# Patient Record
Sex: FEMALE | Employment: FULL TIME | URBAN - METROPOLITAN AREA
[De-identification: names, ages, dates, MRNs, and addresses within clinical notes are randomized per-mention and may not be internally consistent; named-entity substitution may affect disease eponyms.]

---

## 2022-01-26 ENCOUNTER — OFFICE VISIT (OUTPATIENT)
Dept: URGENT CARE | Facility: CLINIC | Age: 57
End: 2022-01-26
Payer: COMMERCIAL

## 2022-01-26 VITALS
SYSTOLIC BLOOD PRESSURE: 120 MMHG | TEMPERATURE: 99 F | OXYGEN SATURATION: 99 % | BODY MASS INDEX: 23.39 KG/M2 | HEIGHT: 63 IN | DIASTOLIC BLOOD PRESSURE: 81 MMHG | HEART RATE: 75 BPM | WEIGHT: 132 LBS | RESPIRATION RATE: 18 BRPM

## 2022-01-26 DIAGNOSIS — R07.89 CHEST WALL PAIN: Primary | ICD-10-CM

## 2022-01-26 PROCEDURE — 99203 OFFICE O/P NEW LOW 30 MIN: CPT | Mod: S$GLB,,, | Performed by: PHYSICIAN ASSISTANT

## 2022-01-26 PROCEDURE — 3079F PR MOST RECENT DIASTOLIC BLOOD PRESSURE 80-89 MM HG: ICD-10-PCS | Mod: CPTII,S$GLB,, | Performed by: PHYSICIAN ASSISTANT

## 2022-01-26 PROCEDURE — 1159F MED LIST DOCD IN RCRD: CPT | Mod: CPTII,S$GLB,, | Performed by: PHYSICIAN ASSISTANT

## 2022-01-26 PROCEDURE — 3079F DIAST BP 80-89 MM HG: CPT | Mod: CPTII,S$GLB,, | Performed by: PHYSICIAN ASSISTANT

## 2022-01-26 PROCEDURE — 1160F RVW MEDS BY RX/DR IN RCRD: CPT | Mod: CPTII,S$GLB,, | Performed by: PHYSICIAN ASSISTANT

## 2022-01-26 PROCEDURE — 1159F PR MEDICATION LIST DOCUMENTED IN MEDICAL RECORD: ICD-10-PCS | Mod: CPTII,S$GLB,, | Performed by: PHYSICIAN ASSISTANT

## 2022-01-26 PROCEDURE — 71100 XR RIBS 2 VIEW LEFT: ICD-10-PCS | Mod: LT,S$GLB,, | Performed by: RADIOLOGY

## 2022-01-26 PROCEDURE — 71045 X-RAY EXAM CHEST 1 VIEW: CPT | Mod: 59,S$GLB,, | Performed by: RADIOLOGY

## 2022-01-26 PROCEDURE — 71045 XR CHEST 1 VIEW: ICD-10-PCS | Mod: 59,S$GLB,, | Performed by: RADIOLOGY

## 2022-01-26 PROCEDURE — 99203 PR OFFICE/OUTPT VISIT, NEW, LEVL III, 30-44 MIN: ICD-10-PCS | Mod: S$GLB,,, | Performed by: PHYSICIAN ASSISTANT

## 2022-01-26 PROCEDURE — 93010 EKG 12-LEAD: ICD-10-PCS | Mod: S$GLB,,, | Performed by: INTERNAL MEDICINE

## 2022-01-26 PROCEDURE — 93010 ELECTROCARDIOGRAM REPORT: CPT | Mod: S$GLB,,, | Performed by: INTERNAL MEDICINE

## 2022-01-26 PROCEDURE — 3074F SYST BP LT 130 MM HG: CPT | Mod: CPTII,S$GLB,, | Performed by: PHYSICIAN ASSISTANT

## 2022-01-26 PROCEDURE — 3008F BODY MASS INDEX DOCD: CPT | Mod: CPTII,S$GLB,, | Performed by: PHYSICIAN ASSISTANT

## 2022-01-26 PROCEDURE — 93005 EKG 12-LEAD: ICD-10-PCS | Mod: S$GLB,,, | Performed by: PHYSICIAN ASSISTANT

## 2022-01-26 PROCEDURE — 71100 X-RAY EXAM RIBS UNI 2 VIEWS: CPT | Mod: LT,S$GLB,, | Performed by: RADIOLOGY

## 2022-01-26 PROCEDURE — 3074F PR MOST RECENT SYSTOLIC BLOOD PRESSURE < 130 MM HG: ICD-10-PCS | Mod: CPTII,S$GLB,, | Performed by: PHYSICIAN ASSISTANT

## 2022-01-26 PROCEDURE — 93005 ELECTROCARDIOGRAM TRACING: CPT | Mod: S$GLB,,, | Performed by: PHYSICIAN ASSISTANT

## 2022-01-26 PROCEDURE — 1160F PR REVIEW ALL MEDS BY PRESCRIBER/CLIN PHARMACIST DOCUMENTED: ICD-10-PCS | Mod: CPTII,S$GLB,, | Performed by: PHYSICIAN ASSISTANT

## 2022-01-26 PROCEDURE — 3008F PR BODY MASS INDEX (BMI) DOCUMENTED: ICD-10-PCS | Mod: CPTII,S$GLB,, | Performed by: PHYSICIAN ASSISTANT

## 2022-01-26 RX ORDER — NAPROXEN 500 MG/1
500 TABLET ORAL 2 TIMES DAILY
Qty: 20 TABLET | Refills: 0 | Status: SHIPPED | OUTPATIENT
Start: 2022-01-26 | End: 2022-02-06

## 2022-01-26 RX ORDER — NAPROXEN 500 MG/1
500 TABLET ORAL 2 TIMES DAILY
Qty: 20 TABLET | Refills: 0 | Status: SHIPPED | OUTPATIENT
Start: 2022-01-26 | End: 2022-01-26

## 2022-01-26 NOTE — PROGRESS NOTES
"Subjective:       Patient ID: Cecile Almanza is a 56 y.o. female.    Vitals:  height is 5' 3" (1.6 m) and weight is 59.9 kg (132 lb). Her temperature is 98.6 °F (37 °C). Her blood pressure is 120/81 and her pulse is 75. Her respiration is 18 and oxygen saturation is 99%.     Chief Complaint: Abdominal Pain    Patient stated pain started Monday evening after cleaning her tub. States that she feels like she may have pulled something. Denies fall, slip, trauma or injury. Reports treating with naproxen with mild improvement    Abdominal Pain  This is a new problem. Episode onset: 1/23/2022. The onset quality is sudden. The pain is located in the LUQ. The pain is at a severity of 6/10. The quality of the pain is sharp. The abdominal pain radiates to the back. Pertinent negatives include no constipation, diarrhea, dysuria, frequency, nausea or vomiting. The pain is aggravated by certain positions and deep breathing. She has tried acetaminophen (cold/hot compress) for the symptoms. The treatment provided mild relief.       Cardiovascular: Positive for chest pain (left sided chest wall (lower)). Negative for chest trauma and sob on exertion.   Gastrointestinal: Negative for abdominal pain, nausea, vomiting, constipation and diarrhea.   Genitourinary: Negative for dysuria and frequency.       Objective:      Physical Exam   Constitutional: She is oriented to person, place, and time. She appears well-developed. She is cooperative.  Non-toxic appearance. She does not appear ill. No distress.   HENT:   Head: Normocephalic and atraumatic.   Ears:   Right Ear: Hearing normal.   Left Ear: Hearing normal.   Eyes: Conjunctivae and lids are normal. No scleral icterus.   Neck: Phonation normal. Neck supple.   Cardiovascular: Normal rate, regular rhythm and normal heart sounds.   No murmur heard.Exam reveals no gallop and no friction rub.   Pulmonary/Chest: Effort normal and breath sounds normal. No stridor. No respiratory distress. She " has no wheezes. She has no rhonchi. She has no rales. She exhibits tenderness and bony tenderness. She exhibits no crepitus, no edema, no deformity and no swelling.       Abdominal: Normal appearance and bowel sounds are normal. She exhibits no distension and no mass. Soft. There is no abdominal tenderness. There is no rebound, no guarding, no left CVA tenderness and no right CVA tenderness. No hernia.   Neurological: She is alert and oriented to person, place, and time. Coordination normal.   Skin: Skin is intact, not diaphoretic and not pale.   Psychiatric: Her speech is normal and behavior is normal. Judgment and thought content normal.   Nursing note and vitals reviewed.        Assessment:       1. Chest wall pain          Plan:         Chest wall pain  -     Cancel: XR RIB LEFT W/ PA CHEST; Future; Expected date: 01/26/2022  -     X-Ray Ribs 2 View Left; Future; Expected date: 01/26/2022  -     XR CHEST 1 VIEW; Future; Expected date: 01/26/2022  -     naproxen (EC NAPROSYN) 500 MG EC tablet; Take 1 tablet (500 mg total) by mouth 2 (two) times daily.  Dispense: 20 tablet; Refill: 0  -     IN OFFICE EKG 12-LEAD (to [facility])    XR CHEST 1 VIEW    Result Date: 1/26/2022  EXAMINATION: XR CHEST 1 VIEW CLINICAL HISTORY: Other chest pain FINDINGS: One view: Heart size is normal.  Lungs are clear and the bones show nothing unusual.     Impression: No acute process seen. Electronically signed by: Aakash Gutierrez MD Date:    01/26/2022 Time:    16:02    X-Ray Ribs 2 View Left    Result Date: 1/26/2022  EXAMINATION: XR RIBS 2 VIEW LEFT CLINICAL HISTORY: Other chest pain FINDINGS: No pneumothorax pleural fluid or lung contusion seen.  No rib fracture or rib lesion seen.     No acute process seen. Electronically signed by: Aakash Gutierrez MD Date:    01/26/2022 Time:    16:02  Imaging reviewed by me in PACS and with patient    EKG interpretation- NSR rate of 62 BPM. No acute ischemia or STEMI. Normal EKG    Pain does not sound  cardiac in nature. Will obtain EKG and treat for musculoskeletal pain. Given strict ER precautions. Discussed with patient the importance of f/u with their primary care provider. Urged to go to the ER for any worsening signs or symptoms.     Patient Instructions   You must understand that you have received treatment at an Urgent Care facility only, and that you may be  released before all of your medical problems are known or treated. Urgent Care facilities are not equipped to  handle life threatening emergencies. It is recommended that you seek care at an Emergency Department for  further evaluation of worsening or concerning symptoms, or possibly life threatening conditions as  discussed.  Patient Education       Costochondritis   The Basics   Written by the doctors and editors at [facility]   What is costochondritis? -- Costochondritis is a condition that causes pain and tenderness in your chest. The pain happens in an area called the costosternal joints, where the ribs meet the breastbone (figure 1). The pain from costochondritis affects only a small area and does not always get worse when you move around.  What causes costochondritis? -- Most of the time, doctors don't know why people get costochondritis. But in some people, it might be caused by:  · A blow to the chest  · Heavy lifting or hard exercise  · An illness that causes you to cough and sneeze  What are the symptoms of costochondritis? -- The symptoms include:  · Pain and tenderness in the chest - The pain can be sharp, or it might be dull and gnawing  · Pain when you take a deep breath  · Pain when you cough  Is there a test for costochondritis? -- No. There is no test. But your doctor or nurse should be able to tell if you have it by learning about your symptoms and doing an exam. Sometimes, they might do other tests to make sure you do not have a different problem.  How is costochondritis treated? -- Costochondritis usually goes away without any  treatment. But there are things that can help you feel better sooner. Some people feel better if they:  · Do stretching exercises  · Put a heating pad on the painful area a few times a day.  Some over-the-counter medicines can also help ease pain, including:  · Pain-relieving creams that have capsaicin or salicylates in them  · Patches, creams, or gels that contain a numbing medicine called lidocaine  · Pain-relieving pills such as acetaminophen (sample brand name: Tylenol) or ibuprofen (sample brand name: Advil)  · 1% diclofenac gel (brand name: Voltaren)  Sometimes, your doctor might also prescribe medicines that ease pain, including:  · Cyclobenzaprine (sample brand name: Flexeril)  · Amitriptyline (sample brand name: Elavil)  All topics are updated as new evidence becomes available and our peer review process is complete.  This topic retrieved from [employer] on: Sep 21, 2021.  Topic 18038 Version 10.0  Release: 29.4.2 - C29.263  © 2021 UpToDate, Inc. and/or its affiliates. All rights reserved.  figure 1: Costochrondritis     Costochondritis causes pain and tenderness in the costosternal joints.  Graphic 41989 Version 2.0    Consumer Information Use and Disclaimer   This information is not specific medical advice and does not replace information you receive from your health care provider. This is only a brief summary of general information. It does NOT include all information about conditions, illnesses, injuries, tests, procedures, treatments, therapies, discharge instructions or life-style choices that may apply to you. You must talk with your health care provider for complete information about your health and treatment options. This information should not be used to decide whether or not to accept your health care provider's advice, instructions or recommendations. Only your health care provider has the knowledge and training to provide advice that is right for you. The use of this information is governed by  the [employer] End User License Agreement, available at https://www.[employer].com/en/solutions/Haloband/about/tayla.The use of [employer] content is governed by the [employer] Terms of Use. ©2021 UpToDate, Inc. All rights reserved.  Copyright   © 2021 UpToDate, Inc. and/or its affiliates. All rights reserved.

## 2022-01-26 NOTE — PATIENT INSTRUCTIONS
You must understand that you have received treatment at an Urgent Care facility only, and that you may be  released before all of your medical problems are known or treated. Urgent Care facilities are not equipped to  handle life threatening emergencies. It is recommended that you seek care at an Emergency Department for  further evaluation of worsening or concerning symptoms, or possibly life threatening conditions as  discussed.  Patient Education       Costochondritis   The Basics   Written by the doctors and editors at [facility]   What is costochondritis? -- Costochondritis is a condition that causes pain and tenderness in your chest. The pain happens in an area called the costosternal joints, where the ribs meet the breastbone (figure 1). The pain from costochondritis affects only a small area and does not always get worse when you move around.  What causes costochondritis? -- Most of the time, doctors don't know why people get costochondritis. But in some people, it might be caused by:  · A blow to the chest  · Heavy lifting or hard exercise  · An illness that causes you to cough and sneeze  What are the symptoms of costochondritis? -- The symptoms include:  · Pain and tenderness in the chest - The pain can be sharp, or it might be dull and gnawing  · Pain when you take a deep breath  · Pain when you cough  Is there a test for costochondritis? -- No. There is no test. But your doctor or nurse should be able to tell if you have it by learning about your symptoms and doing an exam. Sometimes, they might do other tests to make sure you do not have a different problem.  How is costochondritis treated? -- Costochondritis usually goes away without any treatment. But there are things that can help you feel better sooner. Some people feel better if they:  · Do stretching exercises  · Put a heating pad on the painful area a few times a day.  Some over-the-counter medicines can also help ease pain,  including:  · Pain-relieving creams that have capsaicin or salicylates in them  · Patches, creams, or gels that contain a numbing medicine called lidocaine  · Pain-relieving pills such as acetaminophen (sample brand name: Tylenol) or ibuprofen (sample brand name: Advil)  · 1% diclofenac gel (brand name: Voltaren)  Sometimes, your doctor might also prescribe medicines that ease pain, including:  · Cyclobenzaprine (sample brand name: Flexeril)  · Amitriptyline (sample brand name: Elavil)  All topics are updated as new evidence becomes available and our peer review process is complete.  This topic retrieved from [employer] on: Sep 21, 2021.  Topic 27590 Version 10.0  Release: 29.4.2 - C29.263  © 2021 UpToDate, Inc. and/or its affiliates. All rights reserved.  figure 1: Costochrondritis     Costochondritis causes pain and tenderness in the costosternal joints.  Graphic 45477 Version 2.0    Consumer Information Use and Disclaimer   This information is not specific medical advice and does not replace information you receive from your health care provider. This is only a brief summary of general information. It does NOT include all information about conditions, illnesses, injuries, tests, procedures, treatments, therapies, discharge instructions or life-style choices that may apply to you. You must talk with your health care provider for complete information about your health and treatment options. This information should not be used to decide whether or not to accept your health care provider's advice, instructions or recommendations. Only your health care provider has the knowledge and training to provide advice that is right for you. The use of this information is governed by the [employer] End User License Agreement, available at https://www.[employer].Splash/en/solutions/[employer]ika/about/tayla.The use of [employer] content is governed by the [employer] Terms of Use. ©2021 UpToDate, Inc. All rights reserved.  Copyright    © 2021 [employer] and/or its affiliates. All rights reserved.

## 2022-02-06 ENCOUNTER — OFFICE VISIT (OUTPATIENT)
Dept: URGENT CARE | Facility: CLINIC | Age: 57
End: 2022-02-06
Payer: COMMERCIAL

## 2022-02-06 VITALS
RESPIRATION RATE: 16 BRPM | OXYGEN SATURATION: 98 % | SYSTOLIC BLOOD PRESSURE: 134 MMHG | DIASTOLIC BLOOD PRESSURE: 78 MMHG | TEMPERATURE: 98 F | HEART RATE: 82 BPM

## 2022-02-06 DIAGNOSIS — S23.41XD SPRAIN OF COSTAL CARTILAGE, SUBSEQUENT ENCOUNTER: Primary | ICD-10-CM

## 2022-02-06 DIAGNOSIS — M62.830 BACK SPASM: ICD-10-CM

## 2022-02-06 PROCEDURE — 1160F RVW MEDS BY RX/DR IN RCRD: CPT | Mod: CPTII,S$GLB,, | Performed by: NURSE PRACTITIONER

## 2022-02-06 PROCEDURE — 96372 THER/PROPH/DIAG INJ SC/IM: CPT | Mod: S$GLB,,, | Performed by: NURSE PRACTITIONER

## 2022-02-06 PROCEDURE — 1160F PR REVIEW ALL MEDS BY PRESCRIBER/CLIN PHARMACIST DOCUMENTED: ICD-10-PCS | Mod: CPTII,S$GLB,, | Performed by: NURSE PRACTITIONER

## 2022-02-06 PROCEDURE — 3075F PR MOST RECENT SYSTOLIC BLOOD PRESS GE 130-139MM HG: ICD-10-PCS | Mod: CPTII,S$GLB,, | Performed by: NURSE PRACTITIONER

## 2022-02-06 PROCEDURE — 96372 PR INJECTION,THERAP/PROPH/DIAG2ST, IM OR SUBCUT: ICD-10-PCS | Mod: S$GLB,,, | Performed by: NURSE PRACTITIONER

## 2022-02-06 PROCEDURE — 99213 OFFICE O/P EST LOW 20 MIN: CPT | Mod: 25,S$GLB,, | Performed by: NURSE PRACTITIONER

## 2022-02-06 PROCEDURE — 3078F DIAST BP <80 MM HG: CPT | Mod: CPTII,S$GLB,, | Performed by: NURSE PRACTITIONER

## 2022-02-06 PROCEDURE — 1159F MED LIST DOCD IN RCRD: CPT | Mod: CPTII,S$GLB,, | Performed by: NURSE PRACTITIONER

## 2022-02-06 PROCEDURE — 99213 PR OFFICE/OUTPT VISIT, EST, LEVL III, 20-29 MIN: ICD-10-PCS | Mod: 25,S$GLB,, | Performed by: NURSE PRACTITIONER

## 2022-02-06 PROCEDURE — 3078F PR MOST RECENT DIASTOLIC BLOOD PRESSURE < 80 MM HG: ICD-10-PCS | Mod: CPTII,S$GLB,, | Performed by: NURSE PRACTITIONER

## 2022-02-06 PROCEDURE — 1159F PR MEDICATION LIST DOCUMENTED IN MEDICAL RECORD: ICD-10-PCS | Mod: CPTII,S$GLB,, | Performed by: NURSE PRACTITIONER

## 2022-02-06 PROCEDURE — 3075F SYST BP GE 130 - 139MM HG: CPT | Mod: CPTII,S$GLB,, | Performed by: NURSE PRACTITIONER

## 2022-02-06 RX ORDER — KETOROLAC TROMETHAMINE 30 MG/ML
30 INJECTION, SOLUTION INTRAMUSCULAR; INTRAVENOUS
Status: COMPLETED | OUTPATIENT
Start: 2022-02-06 | End: 2022-02-06

## 2022-02-06 RX ORDER — TIZANIDINE 4 MG/1
4 TABLET ORAL EVERY 12 HOURS PRN
Qty: 20 TABLET | Refills: 0 | Status: SHIPPED | OUTPATIENT
Start: 2022-02-06 | End: 2022-02-16

## 2022-02-06 RX ADMIN — KETOROLAC TROMETHAMINE 30 MG: 30 INJECTION, SOLUTION INTRAMUSCULAR; INTRAVENOUS at 02:02

## 2022-02-06 NOTE — PATIENT INSTRUCTIONS
Patient Education       Muscle Spasms Discharge Instructions   About this topic   A muscle spasm is a sudden, often painful, tightening of a muscle. This can involve part of a muscle, the whole muscle, or even a group of muscles. A muscle spasm is also called a muscle cramp and it can last for a few seconds or a few minutes. Most of the time, muscle spasms will go away without treatment.  General   What Can Stop a Muscle Spasm?   · Stretching ? Gentle stretching should help stop the spasm. Most often, when a muscle is spasming or shortening in one direction, you stretch the muscle in the opposite direction. Stretching exercises keep your muscles flexible. They also stop them from getting tight.  · Start by doing each of these stretches 2 to 3 times. In order for your body to make changes, you will need to hold these stretches for 20 to 30 seconds. Try to do the stretches 2 to 3 times each day. Do all exercises slowly.  ? Calf stretches standing ? Stand about 12 to 18 inches (30 to 45 cm) away from a wall. Place your hands on the wall at shoulder level. Lean forward. Stretch your left leg straight behind you. Make sure the heel is flat on the floor and the knee straight. Now, bend the knee of the right leg. Be sure that the heel does not come up. Bend your left knee forward until you feel a stretch in the back of the calf of your right leg. This will feel strange, but it is the best way to stretch this calf muscle. Repeat on the other side.  ? Thigh stretches standing ? Stand close to a wall or chair for balance. Bend one knee up and grab the ankle behind you with the hand on the same side. Pull your foot closer to your back while bringing the hip backwards. You should feel a stretch at the front of your thigh, hip, and knee. You can also stretch the front of the thigh the same way when you are lying on your side in bed. Lie on the side that is not having the cramp and bend the knee of your top leg back. Grab hold of  your ankle. Then, pull your foot back towards your buttocks until you feel a stretch in the front of your thigh.  ? Hamstring stretches seated ? Sit up straight on the edge of a chair. Make sure you keep your back straight. Straighten your knee on your left leg. Keep your heel on the floor. Bend forward at the waist towards your foot while keeping your upper back straight. Bend forward until you feel a stretch in the back of your thigh. Repeat on the other leg.  ? Single knee to chest ? Lie on your back. Pull one knee towards your chest until you feel a stretch in your lower back and buttock area. Repeat with the other knee. If you have knee problems, pull your knee up by grabbing the back of your thigh instead of the front of your knee. You can also do this exercise by grabbing both knees at the same time.             What care is needed at home?   · Gentle stretching should help stop a spasm. Often, you can ease the spasm just by stretching the muscle. Stretching exercises keep your muscles flexible. They also stop them from getting too tight. Do stretches slowly and hold each stretch for 20 to 30 seconds. Try to do the stretches you were shown 2 to 3 times each day.  · Ice or heat may help ease your pain. Either one may help stop a spasm, but most people find that heat is more helpful.  ? Soak the sore area in warm water or using a heating pad can help stop the spasm and lower pain. Heat also helps muscles stretch easier. Do not leave a heating pad on more than 20 minutes at a time. Be sure to check your skin while the heating pad is on to avoid burns. Never go to sleep with a heating pad on.  ? Putting ice on a muscle that is in spasm can help ease the spasm and reduce pain. Use an ice pack or bag of frozen vegetables wrapped in a towel over the painful part. Never put ice right on the skin. Do not leave the ice on more than 10 to 15 minutes at a time. Do not try to stretch the muscle right after  icing.  · Massaging the cramping muscle with firm pressure may help ease the spasm.  · Drinking extra fluids can help muscle spasms if they are caused by a loss of body fluids. Avoid intense exercise in hot and humid weather to lower the chance of getting muscle spasms.  · Sometimes, you may get muscle spasms if you dont get enough of certain nutrients in your diet, like potassium, magnesium, or carbohydrates. If this is the case, changing your diet can help you to avoid muscle cramps. Talk to your doctor about what to eat and drink before and after exercise.  · You may want to take medicine like ibuprofen, naproxen, or acetaminophen to help with pain.  What follow-up care is needed?   Your doctor may ask you to make visits to the office to check on your progress. Be sure to keep these visits.  What can be done to prevent this health problem?   · Drink lots of water, especially on hot days.  · If you are working out for long periods of time, drink a diluted sports drink (half water, half sport drink). Note: If you have high blood pressure, heart failure, or high blood sugar, talk with your doctor before using sports drinks.  · Talk with your doctor about any changes to your diet that may be needed.  · If the spasms are due to a health problem or vitamin deficiency, talk with your doctor about whether vitamins, diet changes, or drugs would be helpful.  When do I need to call the doctor?   If your muscle spasms get worse and you do not get relief from any of the treatments listed above, call your doctor. Your doctor may want to do some tests to find out if there is a health problem that is causing your muscle spasms.  Teach Back: Helping You Understand   The Teach Back Method helps you understand the information we are giving you. After you talk with the staff, tell them in your own words what you learned. This helps to make sure the staff has described each thing clearly. It also helps to explain things that may  have been confusing. Before going home, make sure you can do these:  · I can tell you about my condition.  · I can tell you what may help ease my pain.  · I can tell you what I will do if I have more muscle spasms.  Where can I learn more?   American Academy [employer] Orthopedic [employer]geons  https://orthoinfo.aaos.org/en/diseases--conditions/muscle-cramps   [employer]  http://www.betterhealth.maikel.gov.au/bhcv2/bhcarticles.nsf/pages/Muscle_cramp   Last Reviewed Date   2021-06-18  Consumer Information Use and Disclaimer   This information is not specific medical advice and does not replace information you receive from your health care provider. This is only a brief summary of general information. It does NOT include all information about conditions, illnesses, injuries, tests, procedures, treatments, therapies, discharge instructions or life-style choices that may apply to you. You must talk with your health care provider for complete information about your health and treatment options. This information should not be used to decide whether or not to accept your health care providers advice, instructions or recommendations. Only your health care provider has the knowledge and training to provide advice that is right for you.  Copyright   Copyright © 2021 UpToDate, Inc. and its affiliates and/or licensors. All rights reserved.

## 2022-02-06 NOTE — PROGRESS NOTES
Subjective:       Patient ID: Cecile Almanza is a 56 y.o. female.    Vitals:  tympanic temperature is 98.4 °F (36.9 °C). Her blood pressure is 134/78 and her pulse is 82. Her respiration is 16 and oxygen saturation is 98%.     Chief Complaint: Back Pain    Ms. Almanza comes to clinic today with complaint of left lateral mid back pain.  She was diagnosed with rib strain last week and thinks that the back spasms and pain are associated with this diagnosis.  She is having no difficulty breathing.  No fever or cough.  She states the pain is worse with certain movements and with lying down or getting up.  She feels that the muscles are tense.  She has been unable to take the naproxen because of stomach upset and is been taking Tylenol instead.  Offered Toradol injection in office today to get some anti-inflammatory relief but stressed that this medication will not give more than 24 hours of relief.    Back Pain  This is a new problem. Episode onset: 2 weeks. The problem occurs intermittently. The problem has been waxing and waning since onset. Pain location: left ribs area. Quality: spasm. The pain does not radiate. The pain is at a severity of 5/10. The pain is mild. Exacerbated by: movement. Pertinent negatives include no dysuria, leg pain, numbness or tingling. (Spasms  ) Risk factors include recent trauma. Treatments tried: naproxen.       Genitourinary: Negative for dysuria.   Musculoskeletal: Positive for back pain.   Neurological: Negative for numbness.       Objective:      Physical Exam   Constitutional: She is oriented to person, place, and time. Vital signs are normal. She appears well-developed and well-nourished. She is active and cooperative. No distress.   HENT:   Head: Normocephalic and atraumatic.   Nose: Nose normal.   Mouth/Throat: Oropharynx is clear and moist and mucous membranes are normal.   Eyes: Conjunctivae and lids are normal.   Neck: Trachea normal and phonation normal. Neck supple.    Cardiovascular: Normal rate, intact distal pulses and normal pulses.   Pulmonary/Chest: Effort normal and breath sounds normal.   Abdominal: Normal appearance.   Musculoskeletal:         General: No deformity or edema.      Thoracic back: She exhibits tenderness and spasm. She exhibits no bony tenderness.        Back:    Neurological: She is alert and oriented to person, place, and time. She has normal strength and normal reflexes. No sensory deficit.   Skin: Skin is warm, dry, intact and not diaphoretic.   Psychiatric: She has a normal mood and affect. Her speech is normal and behavior is normal. Judgment and thought content normal. Cognition and memory  Nursing note and vitals reviewed.        Assessment:       1. Sprain of costal cartilage, subsequent encounter    2. Back spasm          Plan:         Sprain of costal cartilage, subsequent encounter  -     ketorolac injection 30 mg    Back spasm  -     tiZANidine (ZANAFLEX) 4 MG tablet; Take 1 tablet (4 mg total) by mouth every 12 (twelve) hours as needed (back spasm).  Dispense: 20 tablet; Refill: 0      Patient Instructions   Patient Education       Muscle Spasms Discharge Instructions   About this topic   A muscle spasm is a sudden, often painful, tightening of a muscle. This can involve part of a muscle, the whole muscle, or even a group of muscles. A muscle spasm is also called a muscle cramp and it can last for a few seconds or a few minutes. Most of the time, muscle spasms will go away without treatment.  General   What Can Stop a Muscle Spasm?   · Stretching ? Gentle stretching should help stop the spasm. Most often, when a muscle is spasming or shortening in one direction, you stretch the muscle in the opposite direction. Stretching exercises keep your muscles flexible. They also stop them from getting tight.  · Start by doing each of these stretches 2 to 3 times. In order for your body to make changes, you will need to hold these stretches for 20 to 30  seconds. Try to do the stretches 2 to 3 times each day. Do all exercises slowly.  ? Calf stretches standing ? Stand about 12 to 18 inches (30 to 45 cm) away from a wall. Place your hands on the wall at shoulder level. Lean forward. Stretch your left leg straight behind you. Make sure the heel is flat on the floor and the knee straight. Now, bend the knee of the right leg. Be sure that the heel does not come up. Bend your left knee forward until you feel a stretch in the back of the calf of your right leg. This will feel strange, but it is the best way to stretch this calf muscle. Repeat on the other side.  ? Thigh stretches standing ? Stand close to a wall or chair for balance. Bend one knee up and grab the ankle behind you with the hand on the same side. Pull your foot closer to your back while bringing the hip backwards. You should feel a stretch at the front of your thigh, hip, and knee. You can also stretch the front of the thigh the same way when you are lying on your side in bed. Lie on the side that is not having the cramp and bend the knee of your top leg back. Grab hold of your ankle. Then, pull your foot back towards your buttocks until you feel a stretch in the front of your thigh.  ? Hamstring stretches seated ? Sit up straight on the edge of a chair. Make sure you keep your back straight. Straighten your knee on your left leg. Keep your heel on the floor. Bend forward at the waist towards your foot while keeping your upper back straight. Bend forward until you feel a stretch in the back of your thigh. Repeat on the other leg.  ? Single knee to chest ? Lie on your back. Pull one knee towards your chest until you feel a stretch in your lower back and buttock area. Repeat with the other knee. If you have knee problems, pull your knee up by grabbing the back of your thigh instead of the front of your knee. You can also do this exercise by grabbing both knees at the same time.             What care is needed  at home?   · Gentle stretching should help stop a spasm. Often, you can ease the spasm just by stretching the muscle. Stretching exercises keep your muscles flexible. They also stop them from getting too tight. Do stretches slowly and hold each stretch for 20 to 30 seconds. Try to do the stretches you were shown 2 to 3 times each day.  · Ice or heat may help ease your pain. Either one may help stop a spasm, but most people find that heat is more helpful.  ? Soak the sore area in warm water or using a heating pad can help stop the spasm and lower pain. Heat also helps muscles stretch easier. Do not leave a heating pad on more than 20 minutes at a time. Be sure to check your skin while the heating pad is on to avoid burns. Never go to sleep with a heating pad on.  ? Putting ice on a muscle that is in spasm can help ease the spasm and reduce pain. Use an ice pack or bag of frozen vegetables wrapped in a towel over the painful part. Never put ice right on the skin. Do not leave the ice on more than 10 to 15 minutes at a time. Do not try to stretch the muscle right after icing.  · Massaging the cramping muscle with firm pressure may help ease the spasm.  · Drinking extra fluids can help muscle spasms if they are caused by a loss of body fluids. Avoid intense exercise in hot and humid weather to lower the chance of getting muscle spasms.  · Sometimes, you may get muscle spasms if you dont get enough of certain nutrients in your diet, like potassium, magnesium, or carbohydrates. If this is the case, changing your diet can help you to avoid muscle cramps. Talk to your doctor about what to eat and drink before and after exercise.  · You may want to take medicine like ibuprofen, naproxen, or acetaminophen to help with pain.  What follow-up care is needed?   Your doctor may ask you to make visits to the office to check on your progress. Be sure to keep these visits.  What can be done to prevent this health problem?   · Drink  lots of water, especially on hot days.  · If you are working out for long periods of time, drink a diluted sports drink (half water, half sport drink). Note: If you have high blood pressure, heart failure, or high blood sugar, talk with your doctor before using sports drinks.  · Talk with your doctor about any changes to your diet that may be needed.  · If the spasms are due to a health problem or vitamin deficiency, talk with your doctor about whether vitamins, diet changes, or drugs would be helpful.  When do I need to call the doctor?   If your muscle spasms get worse and you do not get relief from any of the treatments listed above, call your doctor. Your doctor may want to do some tests to find out if there is a health problem that is causing your muscle spasms.  Teach Back: Helping You Understand   The Teach Back Method helps you understand the information we are giving you. After you talk with the staff, tell them in your own words what you learned. This helps to make sure the staff has described each thing clearly. It also helps to explain things that may have been confusing. Before going home, make sure you can do these:  · I can tell you about my condition.  · I can tell you what may help ease my pain.  · I can tell you what I will do if I have more muscle spasms.  Where can I learn more?   [employer]  https://orthoinfo.aaos.org/en/diseases--conditions/muscle-cramps   [employer]  http://www.betterhealth.maikel.gov.au/bhcv2/bhcarticles.nsf/pages/Muscle_cramp   Last Reviewed Date   2021-06-18  Consumer Information Use and Disclaimer   This information is not specific medical advice and does not replace information you receive from your health care provider. This is only a brief summary of general information. It does NOT include all information about conditions, illnesses, injuries, tests, procedures, treatments, therapies, discharge instructions or life-style choices  that may apply to you. You must talk with your health care provider for complete information about your health and treatment options. This information should not be used to decide whether or not to accept your health care providers advice, instructions or recommendations. Only your health care provider has the knowledge and training to provide advice that is right for you.  Copyright   Copyright © 2021 [employer] and its affiliates and/or licensors. All rights reserved.